# Patient Record
Sex: MALE | Race: WHITE | NOT HISPANIC OR LATINO | URBAN - METROPOLITAN AREA
[De-identification: names, ages, dates, MRNs, and addresses within clinical notes are randomized per-mention and may not be internally consistent; named-entity substitution may affect disease eponyms.]

---

## 2019-07-07 ENCOUNTER — EMERGENCY (EMERGENCY)
Facility: HOSPITAL | Age: 24
LOS: 1 days | Discharge: HOME | End: 2019-07-07
Attending: EMERGENCY MEDICINE
Payer: COMMERCIAL

## 2019-07-07 VITALS
DIASTOLIC BLOOD PRESSURE: 69 MMHG | WEIGHT: 167.99 LBS | TEMPERATURE: 98 F | HEIGHT: 72 IN | RESPIRATION RATE: 17 BRPM | OXYGEN SATURATION: 99 % | HEART RATE: 86 BPM | SYSTOLIC BLOOD PRESSURE: 116 MMHG

## 2019-07-07 PROCEDURE — 29515 APPLICATION SHORT LEG SPLINT: CPT

## 2019-07-07 PROCEDURE — 73630 X-RAY EXAM OF FOOT: CPT | Mod: 26,RT

## 2019-07-07 PROCEDURE — 73630 X-RAY EXAM OF FOOT: CPT

## 2019-07-07 PROCEDURE — 73610 X-RAY EXAM OF ANKLE: CPT | Mod: 26,RT

## 2019-07-07 PROCEDURE — 99284 EMERGENCY DEPT VISIT MOD MDM: CPT | Mod: 25

## 2019-07-07 PROCEDURE — 73610 X-RAY EXAM OF ANKLE: CPT

## 2019-07-07 PROCEDURE — 29515 APPLICATION SHORT LEG SPLINT: CPT | Mod: RT

## 2019-07-07 RX ORDER — IBUPROFEN 200 MG
400 TABLET ORAL ONCE
Refills: 0 | Status: COMPLETED | OUTPATIENT
Start: 2019-07-07 | End: 2019-07-07

## 2019-07-07 RX ADMIN — Medication 400 MILLIGRAM(S): at 12:33

## 2019-07-07 NOTE — ED PROVIDER NOTE - OBJECTIVE STATEMENT
24yo M no pmh presenting with foot pain. Pt was playing tennis yesterday morning, surface uneven and inverted his right ankle. Pain and bruising to dorsal/lateral foot. Can bear weight, but more painful. Didn't take any pain meds this morning.

## 2019-07-07 NOTE — ED PROVIDER NOTE - PHYSICAL EXAMINATION
Gen: No acute distress, alert, cooperative  HENT: Normocephalic, atraumatic.   MSK: Normal ROM CLEMENTINA LE, DP pulses intact CLEMENTINA. Bruising to dorsal lateral right foot, tender to lateral right foot at the mid and distal end of the foot, non-tender elsewhere including planta surface, calcaneus, midfoot, and malleolus.  Skin: warm and dry  Neuro: Normal strength and sensation CLEMENTINA LE.  Psych: Normal affect

## 2019-07-07 NOTE — ED PROVIDER NOTE - PROGRESS NOTE DETAILS
Fx at base of 5th MT noted.  Borderline avulsion v emeli fx.  Will immobilize in short leg splint, recommend nwb, have pt f/u podiatry.  --BMM

## 2019-07-07 NOTE — ED PROVIDER NOTE - NSFOLLOWUPINSTRUCTIONS_ED_ALL_ED_FT
-Follow-up with podiatrist in 1 week. Call for appointment. You have a sainz fracture. Fracture of fifth metatarsal bone   -Keep splint on until you see podiatrist. Use crutches and keep weight off right foot. Keep splint dry at all times.   -Return to the Emergency Department for any worsening or concerning symptoms such as fevers, severe pain, trouble breathing, weakness or lightheadedness.  You can take 1000mg of acetaminophen every 6hrs as needed for pain. Do not take more than 4000mg per day.   Can use 400mg Ibuprofen every 4 to 6 hours for pain control as needed. Do not take more than 3200mg per day. Take ibuprofen with food. Review all warning labels before taking.

## 2019-07-07 NOTE — ED PROVIDER NOTE - NSFOLLOWUPCLINICS_GEN_ALL_ED_FT
[facility]  Podiatry  [address] [place] [address]  Phone: [phone]  Fax:     [facility]  Podiatry  [address] [place] [address]  Phone: [phone]  Fax:   Follow Up Time:     [facility]  Podiatry/Wound Care  [address] [place] [address]  Phone: [phone]  Fax: [phone]  Follow Up Time:

## 2019-07-07 NOTE — ED ADULT NURSE NOTE - OBJECTIVE STATEMENT
23 yr old male was playing tennis and rolled hi left ankle, now swollen and bruised difficulty bearing weight. no other medical problems. on assessment a and o x 3 lungs clear abd soft non tender no swelling in r ankle just left.

## 2019-07-07 NOTE — ED PROVIDER NOTE - CLINICAL SUMMARY MEDICAL DECISION MAKING FREE TEXT BOX
22yo M no pmh presenting with foot pain after inversion injury playing tennis yesterday. Suspect sprain. Will get xray, pain control, splint. 24yo M no pmh presenting with foot pain after inversion injury playing tennis yesterday. Suspect sprain. Will get xray, pain control, splint.    Attending Statement: Agree with the above.  Ankle sprain v 5th mt fx.  Mild tenderness to palpation at lateral mal.  XR, motrin, splint, d/c.  --BMM

## 2019-11-18 NOTE — ED ADULT NURSE NOTE - PAIN RATING/NUMBER SCALE (0-10): ACTIVITY
-- Message is from the [facility]--    Caller is requesting an appointment - at a sooner time than what was available.       Caller declined scheduling with a trusted partner or sister site               Patient is willing to be seen by: PCP only    Reason for Visit: 2. Patient was recently discharged from [facility] on 11/17/19. Patient would like to be seen today by Dr. Arenas stated he doesn't want to wait until Friday. However we did schedule an appt for 11/221/19.       Is the patient currently scheduled? Yes.  Appointment date:  11/22/19    Caller Information       Type Contact Phone    11/18/2019 09:06 AM Phone (Incoming) Juan Carlos Zavala (Self) [phone] (H)          Alternative phone number: [phone]    Turnaround time given to caller:   \"This message will be sent to [state Provider's name]. The clinical team will fulfill your request as soon as they review your message.\"   3

## 2020-07-09 ENCOUNTER — TRANSCRIPTION ENCOUNTER (OUTPATIENT)
Age: 25
End: 2020-07-09

## 2021-03-15 ENCOUNTER — TRANSCRIPTION ENCOUNTER (OUTPATIENT)
Age: 26
End: 2021-03-15

## 2021-03-28 ENCOUNTER — TRANSCRIPTION ENCOUNTER (OUTPATIENT)
Age: 26
End: 2021-03-28

## 2021-04-07 PROBLEM — Z00.00 ENCOUNTER FOR PREVENTIVE HEALTH EXAMINATION: Status: ACTIVE | Noted: 2021-04-07

## 2022-09-01 ENCOUNTER — NON-APPOINTMENT (OUTPATIENT)
Age: 27
End: 2022-09-01